# Patient Record
Sex: MALE | URBAN - METROPOLITAN AREA
[De-identification: names, ages, dates, MRNs, and addresses within clinical notes are randomized per-mention and may not be internally consistent; named-entity substitution may affect disease eponyms.]

---

## 2023-07-07 ENCOUNTER — TELEPHONE (OUTPATIENT)
Dept: PLASTIC SURGERY | Facility: CLINIC | Age: 68
End: 2023-07-07

## 2023-07-07 ENCOUNTER — TRANSCRIBE ORDERS (OUTPATIENT)
Dept: OTHER | Age: 68
End: 2023-07-07

## 2023-07-07 DIAGNOSIS — L71.1 RHINOPHYMA: Primary | ICD-10-CM

## 2023-07-07 NOTE — TELEPHONE ENCOUNTER
[facility] Call Center    Phone Message    May a detailed message be left on voicemail: yes     Reason for Call: Appointment Intake    Referring Provider Name:  Dr. Kelly Lara at 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   Dx is not on guidelines for scheduling. Sending encounter for review.        Action Taken: Message routed to:  [facility]astic [facility]urg    Travel Screening: Not Applicable

## 2023-07-20 NOTE — TELEPHONE ENCOUNTER
[facility] Call Center    Phone Message    May a detailed message be left on voicemail: yes     Reason for Call: Other: Pt is being referred for Rhinophyma, this Dx is not in the GL's. Please advise. Thank you!      Action Taken: Message routed to:  [facility] Plastic Surgery    Travel Screening: Not Applicable

## 2023-07-20 NOTE — TELEPHONE ENCOUNTER
Returned call today to discuss plastic surgery referral for Rhinophyma.  Pt did not answer but I was able to leave my direct call back number to discuss appointment.  Bradly CASTANEDA RN

## 2023-07-24 NOTE — TELEPHONE ENCOUNTER
FUTURE VISIT INFORMATION      FUTURE VISIT INFORMATION:  Date: 8/30/23  Time: 2:30pm  [place] [facility] [place] INFORMATION:  Referring provider:  Marco Mendoza NP  Referring providers clinic:  [facility]  Reason for visit/diagnosis  Rhinophyma    RECORDS REQUESTED FROM:       Clinic name Comments Records Status Imaging Status   [facility] Request for recs sent 7/24- resent 8/8/23- received and sent to scanning EPIC

## 2023-08-09 ENCOUNTER — TELEPHONE (OUTPATIENT)
Dept: PLASTIC SURGERY | Facility: CLINIC | Age: 68
End: 2023-08-09
Payer: COMMERCIAL

## 2023-08-09 NOTE — TELEPHONE ENCOUNTER
Pt called today to ask about need for nicotine testing prior to upcoming appointment on 8/30. Pt spoke with this nurse when scheduling 8/30 appt and took the advise to quit smoking. His quit date is around 7/27, 2 weeks ago. Pt is hoping to have surgery soon and motivated to follow suggestions. I updated pt that it is too early to test now as it needs to be a full 6 weeks after cessation but that we would order test for him at his upcoming appt if Dr Malhotra recommends surgery.  Pt in agreement with plan.  Bradly CASTANEDA RN

## 2023-08-30 ENCOUNTER — LAB (OUTPATIENT)
Dept: LAB | Facility: CLINIC | Age: 68
End: 2023-08-30
Payer: COMMERCIAL

## 2023-08-30 ENCOUNTER — OFFICE VISIT (OUTPATIENT)
Dept: PLASTIC SURGERY | Facility: CLINIC | Age: 68
End: 2023-08-30
Payer: COMMERCIAL

## 2023-08-30 ENCOUNTER — PRE VISIT (OUTPATIENT)
Dept: PLASTIC SURGERY | Facility: CLINIC | Age: 68
End: 2023-08-30
Payer: COMMERCIAL

## 2023-08-30 VITALS
BODY MASS INDEX: 40.97 KG/M2 | WEIGHT: 261 LBS | HEIGHT: 67 IN | SYSTOLIC BLOOD PRESSURE: 159 MMHG | OXYGEN SATURATION: 97 % | HEART RATE: 75 BPM | DIASTOLIC BLOOD PRESSURE: 80 MMHG

## 2023-08-30 DIAGNOSIS — L71.1 RHINOPHYMA: Primary | ICD-10-CM

## 2023-08-30 DIAGNOSIS — L71.1 RHINOPHYMA: ICD-10-CM

## 2023-08-30 LAB
ANION GAP SERPL CALCULATED.3IONS-SCNC: 10 MMOL/L (ref 7–15)
BASOPHILS # BLD AUTO: 0.1 10E3/UL (ref 0–0.2)
BASOPHILS NFR BLD AUTO: 1 %
BUN SERPL-MCNC: 19 MG/DL (ref 8–23)
CALCIUM SERPL-MCNC: 9.5 MG/DL (ref 8.8–10.2)
CHLORIDE SERPL-SCNC: 105 MMOL/L (ref 98–107)
CREAT SERPL-MCNC: 1.16 MG/DL (ref 0.67–1.17)
DEPRECATED HCO3 PLAS-SCNC: 28 MMOL/L (ref 22–29)
EOSINOPHIL # BLD AUTO: 0.2 10E3/UL (ref 0–0.7)
EOSINOPHIL NFR BLD AUTO: 2 %
ERYTHROCYTE [DISTWIDTH] IN BLOOD BY AUTOMATED COUNT: 12.8 % (ref 10–15)
GFR SERPL CREATININE-BSD FRML MDRD: 69 ML/MIN/1.73M2
GLUCOSE SERPL-MCNC: 128 MG/DL (ref 70–99)
HBA1C MFR BLD: 6.1 %
HCT VFR BLD AUTO: 39.8 % (ref 40–53)
HGB BLD-MCNC: 13.6 G/DL (ref 13.3–17.7)
IMM GRANULOCYTES # BLD: 0 10E3/UL
IMM GRANULOCYTES NFR BLD: 0 %
LYMPHOCYTES # BLD AUTO: 1.4 10E3/UL (ref 0.8–5.3)
LYMPHOCYTES NFR BLD AUTO: 18 %
MCH RBC QN AUTO: 31.1 PG (ref 26.5–33)
MCHC RBC AUTO-ENTMCNC: 34.2 G/DL (ref 31.5–36.5)
MCV RBC AUTO: 91 FL (ref 78–100)
MONOCYTES # BLD AUTO: 0.6 10E3/UL (ref 0–1.3)
MONOCYTES NFR BLD AUTO: 7 %
NEUTROPHILS # BLD AUTO: 5.3 10E3/UL (ref 1.6–8.3)
NEUTROPHILS NFR BLD AUTO: 72 %
NRBC # BLD AUTO: 0 10E3/UL
NRBC BLD AUTO-RTO: 0 /100
PLATELET # BLD AUTO: 243 10E3/UL (ref 150–450)
POTASSIUM SERPL-SCNC: 3.7 MMOL/L (ref 3.4–5.3)
RBC # BLD AUTO: 4.38 10E6/UL (ref 4.4–5.9)
SODIUM SERPL-SCNC: 143 MMOL/L (ref 136–145)
WBC # BLD AUTO: 7.4 10E3/UL (ref 4–11)

## 2023-08-30 PROCEDURE — 36415 COLL VENOUS BLD VENIPUNCTURE: CPT | Performed by: PATHOLOGY

## 2023-08-30 PROCEDURE — 85025 COMPLETE CBC W/AUTO DIFF WBC: CPT | Performed by: PATHOLOGY

## 2023-08-30 PROCEDURE — 83036 HEMOGLOBIN GLYCOSYLATED A1C: CPT | Performed by: STUDENT IN AN ORGANIZED HEALTH CARE EDUCATION/TRAINING PROGRAM

## 2023-08-30 PROCEDURE — 99204 OFFICE O/P NEW MOD 45 MIN: CPT | Performed by: STUDENT IN AN ORGANIZED HEALTH CARE EDUCATION/TRAINING PROGRAM

## 2023-08-30 PROCEDURE — 99000 SPECIMEN HANDLING OFFICE-LAB: CPT | Performed by: PATHOLOGY

## 2023-08-30 PROCEDURE — 80048 BASIC METABOLIC PNL TOTAL CA: CPT | Performed by: PATHOLOGY

## 2023-08-30 RX ORDER — VARDENAFIL HYDROCHLORIDE 20 MG/1
10 TABLET ORAL
COMMUNITY
Start: 2022-12-29

## 2023-08-30 RX ORDER — GEMFIBROZIL 600 MG/1
TABLET, FILM COATED ORAL
COMMUNITY

## 2023-08-30 RX ORDER — METOPROLOL SUCCINATE 50 MG/1
1 TABLET, EXTENDED RELEASE ORAL EVERY MORNING
COMMUNITY
Start: 2022-12-29

## 2023-08-30 RX ORDER — LISINOPRIL AND HYDROCHLOROTHIAZIDE 12.5; 2 MG/1; MG/1
TABLET ORAL
COMMUNITY

## 2023-08-30 RX ORDER — ATORVASTATIN CALCIUM 20 MG/1
1 TABLET, FILM COATED ORAL AT BEDTIME
COMMUNITY
Start: 2022-12-29

## 2023-08-30 ASSESSMENT — PAIN SCALES - GENERAL: PAINLEVEL: NO PAIN (0)

## 2023-08-30 NOTE — NURSING NOTE
"Chief Complaint   Patient presents with    Consult     Jono, is being seen today for a consult regarding rhinomphymatous.       Vitals:    08/30/23 1425   BP: (!) 159/80   BP Location: Left arm   Patient Position: Chair   Cuff Size: Adult Large   Pulse: 75   SpO2: 97%   Weight: 118.4 kg (261 lb)   Height: 1.702 m (5' 7\")       Body mass index is 40.88 kg/m .      Stephania Roberts LPN    "

## 2023-08-30 NOTE — LETTER
"8/30/2023       RE: Jono Simmons  [address]  [facility] [address]     Dear Colleague,    Thank you for referring your patient, Jono Simmons, to the [facility] [place] [facility]. Please see a copy of my visit note below.    PRS    HPI: 67 year-old recent smoker type 2 diabetic presenting with rhinophyma seeking treatment. Patient has noticed the nose overgrowth worsen for the last year, but he started noticing it more 3-4 years ago. It is impacting his breathing and he is self-conscious by it. He would like it treated.     ROS: Negative, see HPI  PMH: HTN, HLD, T2DM, CAD s/p PCI x 6, last MI 6/2022  PSH: No prior surgeries to the nose  Medications: ASA 81, Lipitor, Gemfibrozil, Lisinopril-hydrochlorothiazide, Metoprolol, Vardenafil  Allergies: None  SH: Nonsmoker, but quit 35 days ago  FH: No bleeding or clotting issues, or problems with anesthesia    Examination:  BP (!) 159/80 (BP Location: Left arm, Patient Position: Chair, Cuff Size: Adult Large)   Pulse 75   Ht 1.702 m (5' 7\")   Wt 118.4 kg (261 lb)   SpO2 97%   BMI 40.88 kg/m    Nonlabored breathing   Not distressed  Rhinophyma with some external valve obstruction    A/P: 67M NS type II diabetic w cardiac hx p/w rhinophyma    -Discussed options for intervention including ablative laser vs tangential excision either with dressing changes until secondary intention healing is completed. Since patient is planning to go to [place] for the winter, he would like to plan for surgery in the spring. He states that he will continue smoking cessation as he understands that it is a requirement to optimize modifiable risks factors prior to undergoing surgery.   -Discussed risks of surgery including but not limited to: infection, bleeding, pain, poor scarring, prolonged wound healing, nasal deformity, recurrence, suboptimal aesthetic result. Despite these " risks, patient consents to rhinophyma tangential excision with cauterization and placement of wound dressing. All questions answered.   -We will recheck serum lab studies including HgA1C  -Patient will need clearance from cardiologist for sedation with local infiltration for 60-90 minute case  -Once patient has HgA1C recheck <7 and cardiology clearance, we can place case request  --A total of 45 minutes was devoted to review of chart, direct face-to-face patient counseling and documentation during this encounter, exclusive of any procedure performed.            Again, thank you for allowing me to participate in the care of your patient.      Sincerely,    Aquilino Malhotra MD

## 2023-08-31 NOTE — PROGRESS NOTES
"PRS    HPI: 67 year-old recent smoker type 2 diabetic presenting with rhinophyma seeking treatment. Patient has noticed the nose overgrowth worsen for the last year, but he started noticing it more 3-4 years ago. It is impacting his breathing and he is self-conscious by it. He would like it treated.     ROS: Negative, see HPI  PMH: HTN, HLD, T2DM, CAD s/p PCI x 6, last MI 6/2022  PSH: No prior surgeries to the nose  Medications: ASA 81, Lipitor, Gemfibrozil, Lisinopril-hydrochlorothiazide, Metoprolol, Vardenafil  Allergies: None  SH: Nonsmoker, but quit 35 days ago  FH: No bleeding or clotting issues, or problems with anesthesia    Examination:  BP (!) 159/80 (BP Location: Left arm, Patient Position: Chair, Cuff Size: Adult Large)   Pulse 75   Ht 1.702 m (5' 7\")   Wt 118.4 kg (261 lb)   SpO2 97%   BMI 40.88 kg/m    Nonlabored breathing   Not distressed  Rhinophyma with some external valve obstruction    A/P: 67M NS type II diabetic w cardiac hx p/w rhinophyma    -Discussed options for intervention including ablative laser vs tangential excision either with dressing changes until secondary intention healing is completed. Since patient is planning to go to [place] for the winter, he would like to plan for surgery in the spring. He states that he will continue smoking cessation as he understands that it is a requirement to optimize modifiable risks factors prior to undergoing surgery.   -Discussed risks of surgery including but not limited to: infection, bleeding, pain, poor scarring, prolonged wound healing, nasal deformity, recurrence, suboptimal aesthetic result. Despite these risks, patient consents to rhinophyma tangential excision with cauterization and placement of wound dressing. All questions answered.   -We will recheck serum lab studies including HgA1C  -Patient will need clearance from cardiologist for sedation with local infiltration for 60-90 minute case  -Once patient has HgA1C recheck <7 and " cardiology clearance, we can place case request  --A total of 45 minutes was devoted to review of chart, direct face-to-face patient counseling and documentation during this encounter, exclusive of any procedure performed.    Aquilino Malhotra MD, PhD